# Patient Record
Sex: FEMALE | Race: WHITE
[De-identification: names, ages, dates, MRNs, and addresses within clinical notes are randomized per-mention and may not be internally consistent; named-entity substitution may affect disease eponyms.]

---

## 2018-08-31 NOTE — WOMENS IMAGING REPORT
EXAM DESCRIPTION:  BILAT SCREENING MAMMO W/CAD



COMPLETED DATE/TIME:  8/31/2018 3:03 pm



REASON FOR STUDY:  ROUTINE SCREENING MAMMOGRAM Z12.31 Z12.31  ENCNTR SCREEN MAMMOGRAM FOR MALIGNANT N
EOPLASM OF AILYN



COMPARISON:  Multiple since 2009



TECHNIQUE:  Standard craniocaudal and mediolateral oblique views of each breast recorded using [employer]
l acquisition.



LIMITATIONS:  None.



FINDINGS:  No masses, calcifications or architectural distortion. No areas of suspicion.

Read with the assistance of CAD.

.[facility] - R2 [employer] Version 1.3

.[facility] - R2 Cen[employer] Version 1.3

.[facility] - R2 Cenova Version 2.4

.[facility] - R2 Cenova Version 2.4

.[facility] - R2  Version 9.2



IMPRESSION:  NORMAL MAMMOGRAM.  BIRADS 1.



BREAST DENSITY:  b. There are scattered areas of fibroglandular density.



BIRAD:  1 NEGATIVE



RECOMMENDATION:  ROUTINE SCREENING

Please continue yearly bilateral screening mammography/tomosynthesis in September 2019



COMMENT:  The patient has been notified of the results by letter per SA requirements. Additional no
tification policies are in place for contacting patient with suspicious or incomplete findings.

Quality ID #225: The American College of Radiology recommends an annual screening mammogram for women
 aged 40 years or over. This facility utilizes a reminder system to ensure that all patients receive 
reminder letters, and/or direct phone calls for appointments. This includes reminders for routine scr
eening mammograms, diagnostic mammograms, or other Breast Imaging Interventions when appropriate.  Th
is patient will be placed in the appropriate reminder system.

The American College of Radiology (ACR) has developed recommendations for screening MRI of the breast
s in certain patient populations, to be used in conjunction with mammography.  Breast MRI surveillanc
e may be appropriate for women with more than 20% lifetime risk of developing breast cancer  as deter
mined by genetic testing, significant family history of the disease, or history of mantle radiation f
or Hodgkins Disease.  ACR Practice Guidelines 2008.



TECHNICAL DOCUMENTATION:  FINDING NUMBER: (1)

ASSESSMENT: (1)

JOB ID:  [id number]

 2011 [email or web address]- All Rights Reserved



Reading location - IP/workstation name: [email or web address]

## 2018-11-06 ENCOUNTER — HOSPITAL ENCOUNTER (OUTPATIENT)
Dept: HOSPITAL 62 - OD | Age: 73
End: 2018-11-06
Attending: FAMILY MEDICINE
Payer: MEDICARE

## 2018-11-06 DIAGNOSIS — Z79.899: ICD-10-CM

## 2018-11-06 DIAGNOSIS — J18.9: Primary | ICD-10-CM

## 2018-11-06 DIAGNOSIS — G25.0: ICD-10-CM

## 2018-11-06 LAB
ANION GAP SERPL CALC-SCNC: 12 MMOL/L (ref 5–19)
BUN SERPL-MCNC: 13 MG/DL (ref 7–20)
CALCIUM: 9.8 MG/DL (ref 8.4–10.2)
CHLORIDE SERPL-SCNC: 104 MMOL/L (ref 98–107)
CHOLEST SERPL-MCNC: 201.46 MG/DL (ref 0–200)
CO2 SERPL-SCNC: 28 MMOL/L (ref 22–30)
GLUCOSE SERPL-MCNC: 90 MG/DL (ref 75–110)
LDLC SERPL DIRECT ASSAY-MCNC: 111 MG/DL (ref ?–100)
POTASSIUM SERPL-SCNC: 4.4 MMOL/L (ref 3.6–5)
SODIUM SERPL-SCNC: 144 MMOL/L (ref 137–145)
TRIGL SERPL-MCNC: 72 MG/DL (ref ?–150)
VLDLC SERPL CALC-MCNC: 14 MG/DL (ref 10–31)

## 2018-11-06 PROCEDURE — 36415 COLL VENOUS BLD VENIPUNCTURE: CPT

## 2018-11-06 PROCEDURE — 84443 ASSAY THYROID STIM HORMONE: CPT

## 2018-11-06 PROCEDURE — 80061 LIPID PANEL: CPT

## 2018-11-06 PROCEDURE — 83036 HEMOGLOBIN GLYCOSYLATED A1C: CPT

## 2018-11-06 PROCEDURE — 80048 BASIC METABOLIC PNL TOTAL CA: CPT

## 2018-11-06 PROCEDURE — 71046 X-RAY EXAM CHEST 2 VIEWS: CPT

## 2018-11-06 NOTE — RADIOLOGY REPORT (SQ)
EXAM DESCRIPTION:  CHEST PA/LATERAL



COMPLETED DATE/TIME:  11/6/2018 2:42 pm



REASON FOR STUDY:  PNEUMONIA, UNSPECIFIED ORGANISM,COUGH



COMPARISON:  None.



EXAM PARAMETERS:  NUMBER OF VIEWS: two views

TECHNIQUE: Digital Frontal and Lateral radiographic views of the chest acquired.

RADIATION DOSE: NA

LIMITATIONS: none



FINDINGS:  LUNGS AND PLEURA: Cannot exclude a couple subcentimeter sized left lower lobe pulmonary no
dules.

MEDIASTINUM AND HILAR STRUCTURES: No masses or contour abnormalities.

HEART AND VASCULAR STRUCTURES: Heart normal size.  No evidence for failure.

BONES: No acute findings.

HARDWARE: None in the chest.

OTHER: No other significant finding.



IMPRESSION:  Possible pulmonary nodules.  No acute infiltrate is seen.



TECHNICAL DOCUMENTATION:  JOB ID:  [id number]

 2011 [employer]- All Rights Reserved



Reading location - IP/workstation name: [email or web address]

## 2018-11-13 ENCOUNTER — HOSPITAL ENCOUNTER (OUTPATIENT)
Dept: HOSPITAL 62 - RAD | Age: 73
End: 2018-11-13
Attending: FAMILY MEDICINE
Payer: MEDICARE

## 2018-11-13 DIAGNOSIS — R05: Primary | ICD-10-CM

## 2018-11-13 DIAGNOSIS — R91.1: ICD-10-CM

## 2018-11-13 PROCEDURE — 71260 CT THORAX DX C+: CPT

## 2018-11-13 NOTE — RADIOLOGY REPORT (SQ)
EXAM DESCRIPTION:  CT CHEST WITH



COMPLETED DATE/TIME:  11/13/2018 10:30 am



REASON FOR STUDY:  COUGH (R05), PULMONARY NODULE (R91.1) R05  COUGH R91.1  SOLITARY PULMONARY NODULE



COMPARISON:  Two-view chest 11/6/2018



TECHNIQUE:  CT scan of the chest performed using helical scanning technique with dynamic intravenous 
contrast injection.  Images reviewed with lung, soft tissue and bone windows.  Reconstructed coronal 
and sagittal MPR and MIP images reviewed.  All images stored on PACS.

All CT scanners at this facility use dose modulation, iterative reconstruction, and/or weight based d
osing when appropriate to reduce radiation dose to as low as reasonably achievable (ALARA).

[facility]: Dose Right  [facility]: CareDose    [facility]: Dose Right    [facility]: Teradose 4D    [facility]: Smart Technologies



CONTRAST TYPE AND DOSE:  contrast/concentration: Isovue 350.00 mg/ml; Total Contrast Delivered: 80.0 
ml; Total Saline Delivered: 55.0 ml



RENAL FUNCTION:  GFR > 60.



RADIATION DOSE:  CT Rad equipment meets quality standard of care and radiation dose reduction techniq
ues were employed. CTDIvol: 3.0 mGy. DLP: 113 mGy-cm. .



LIMITATIONS:  None.



FINDINGS:  LUNGS AND PLEURA: No worrisome pulmonary nodules are identified.  There are multiple small
 less than 5 mm pleural-based densities along the right minor fissure and around the periphery of bot
h lower lobes, of doubtful clinical significance.

No acute infiltrates.  No pleural effusion.  No pneumothorax.

HILAR AND MEDIASTINAL STRUCTURES: Benign calcified left hilar lymph nodes.

HEART AND VASCULAR STRUCTURES: No aneurysm or dissection.  No central pulmonary emboli.  No pericardi
al effusion.

HARDWARE: None in the chest.

UPPER ABDOMEN: Multiple calcified granulomas in the liver and spleen

THYROID AND OTHER SOFT TISSUES: 1.6 cm nodule lower pole right lobe thyroid

BONES: No significant finding.

OTHER: No other significant finding.



IMPRESSION:  No worrisome pulmonary nodules



TECHNICAL DOCUMENTATION:  JOB ID:  [id number]

Quality ID # 436: Final reports with documentation of one or more dose reduction techniques (e.g., Au
tomated exposure control, adjustment of the mA and/or kV according to patient size, use of iterative 
reconstruction technique)

 2011 [employer]- All Rights Reserved



Reading location - IP/workstation name: [email or web address]

## 2019-07-11 ENCOUNTER — HOSPITAL ENCOUNTER (OUTPATIENT)
Dept: HOSPITAL 62 - OD | Age: 74
End: 2019-07-11
Attending: FAMILY MEDICINE
Payer: MEDICARE

## 2019-07-11 DIAGNOSIS — E78.5: Primary | ICD-10-CM

## 2019-07-11 DIAGNOSIS — Z79.899: ICD-10-CM

## 2019-07-11 LAB
ANION GAP SERPL CALC-SCNC: 8 MMOL/L (ref 5–19)
BUN SERPL-MCNC: 16 MG/DL (ref 7–20)
CALCIUM: 10 MG/DL (ref 8.4–10.2)
CHLORIDE SERPL-SCNC: 103 MMOL/L (ref 98–107)
CHOLEST SERPL-MCNC: 231.64 MG/DL (ref 0–200)
CO2 SERPL-SCNC: 29 MMOL/L (ref 22–30)
GLUCOSE SERPL-MCNC: 90 MG/DL (ref 75–110)
LDLC SERPL DIRECT ASSAY-MCNC: 114 MG/DL (ref ?–100)
POTASSIUM SERPL-SCNC: 4.7 MMOL/L (ref 3.6–5)
SODIUM SERPL-SCNC: 139.8 MMOL/L (ref 137–145)
TRIGL SERPL-MCNC: 73 MG/DL (ref ?–150)
VLDLC SERPL CALC-MCNC: 15 MG/DL (ref 10–31)

## 2019-07-11 PROCEDURE — 80061 LIPID PANEL: CPT

## 2019-07-11 PROCEDURE — 83036 HEMOGLOBIN GLYCOSYLATED A1C: CPT

## 2019-07-11 PROCEDURE — 36415 COLL VENOUS BLD VENIPUNCTURE: CPT

## 2019-07-11 PROCEDURE — 84443 ASSAY THYROID STIM HORMONE: CPT

## 2019-07-11 PROCEDURE — 80048 BASIC METABOLIC PNL TOTAL CA: CPT

## 2020-01-10 ENCOUNTER — HOSPITAL ENCOUNTER (OUTPATIENT)
Dept: HOSPITAL 62 - WI | Age: 75
End: 2020-01-10
Attending: FAMILY MEDICINE
Payer: MEDICARE

## 2020-01-10 DIAGNOSIS — Z12.31: Primary | ICD-10-CM

## 2020-01-10 PROCEDURE — 77063 BREAST TOMOSYNTHESIS BI: CPT

## 2020-01-10 PROCEDURE — 77067 SCR MAMMO BI INCL CAD: CPT

## 2020-01-10 NOTE — WOMENS IMAGING REPORT
EXAM DESCRIPTION:  3D SCREENING MAMMO BILAT



COMPLETED DATE/TIME:  1/10/2020 10:38 am



REASON FOR STUDY:  Z12.31 ENCOUNTER FOR SCREENING MAMMOGRAM FOR MALIGNANT NEOPLASM OF BREAST Z12.31  
ENCNTR SCREEN MAMMOGRAM FOR MALIGNANT NEOPLASM OF AILYN



COMPARISON:  [id number]



EXAM PARAMETERS:  Views: Standard craniocaudal and mediolateral oblique views of each breast recorded
 using digital acquisition and breast tomosynthesis.

Read with the assistance of CAD.

[facility] [employer]  Version 9.2



LIMITATIONS:  None.



FINDINGS:  No suspicious masses, suspicious calcifications or architectural distortion. No areas of c
oncern.



IMPRESSION:   NEGATIVE MAMMOGRAM. BIRADS 1.



BREAST DENSITY:  b. There are scattered areas of fibroglandular density.



BIRAD:  ASSESSMENT:  1 NEGATIVE



RECOMMENDATION:  ROUTINE SCREENING



COMMENT:  The patient has been notified of the results by letter per MQSA requirements. Additional no
tification policies are in place for contacting patient with suspicious or incomplete findings.

Quality ID #225: The American College of Radiology recommends an annual screening mammogram for women
 aged 40 years or over. This facility utilizes a reminder system to ensure that all patients receive 
reminder letters, and/or direct phone calls for appointments. This includes reminders for routine scr
eening mammograms, diagnostic mammograms, or other Breast Imaging Interventions when appropriate.  Th
is patient will be placed in the appropriate reminder system.



TECHNICAL DOCUMENTATION:  FINDING NUMBER: (1)

ASSESSMENT:  (1)

JOB ID:  [id number]

 2011 [employer]- All Rights Reserved



Reading location - IP/workstation name: [email or web address]

## 2020-06-16 ENCOUNTER — HOSPITAL ENCOUNTER (OUTPATIENT)
Dept: HOSPITAL 62 - OD | Age: 75
End: 2020-06-16
Attending: FAMILY MEDICINE
Payer: MEDICARE

## 2020-06-16 DIAGNOSIS — E78.5: Primary | ICD-10-CM

## 2020-06-16 DIAGNOSIS — G25.0: ICD-10-CM

## 2020-06-16 DIAGNOSIS — Z79.899: ICD-10-CM

## 2020-06-16 LAB
ANION GAP SERPL CALC-SCNC: 2 MMOL/L (ref 5–19)
BUN SERPL-MCNC: 16 MG/DL (ref 7–20)
CALCIUM: 9.4 MG/DL (ref 8.4–10.2)
CHLORIDE SERPL-SCNC: 106 MMOL/L (ref 98–107)
CHOLEST SERPL-MCNC: 195.99 MG/DL (ref 0–200)
CO2 SERPL-SCNC: 31 MMOL/L (ref 22–30)
GLUCOSE SERPL-MCNC: 90 MG/DL (ref 75–110)
LDLC SERPL DIRECT ASSAY-MCNC: 98 MG/DL (ref ?–100)
POTASSIUM SERPL-SCNC: 4.4 MMOL/L (ref 3.6–5)
TRIGL SERPL-MCNC: 92 MG/DL (ref ?–150)
VLDLC SERPL CALC-MCNC: 18 MG/DL (ref 10–31)

## 2020-06-16 PROCEDURE — 80061 LIPID PANEL: CPT

## 2020-06-16 PROCEDURE — 36415 COLL VENOUS BLD VENIPUNCTURE: CPT

## 2020-06-16 PROCEDURE — 84443 ASSAY THYROID STIM HORMONE: CPT

## 2020-06-16 PROCEDURE — 80048 BASIC METABOLIC PNL TOTAL CA: CPT

## 2020-06-16 PROCEDURE — 83036 HEMOGLOBIN GLYCOSYLATED A1C: CPT

## 2021-01-12 ENCOUNTER — HOSPITAL ENCOUNTER (OUTPATIENT)
Dept: HOSPITAL 62 - WI | Age: 76
End: 2021-01-12
Attending: FAMILY MEDICINE
Payer: MEDICARE

## 2021-01-12 DIAGNOSIS — Z12.31: Primary | ICD-10-CM

## 2021-01-12 PROCEDURE — 77067 SCR MAMMO BI INCL CAD: CPT

## 2021-01-12 NOTE — WOMENS IMAGING REPORT
EXAM DESCRIPTION:  BILAT SCREENING MAMMO W/CAD



IMAGES COMPLETED DATE/TIME:  1/12/2021 11:19 am



REASON FOR STUDY:  ROUTINE SCREENING MAMMOGRAM Z12.31 Z12.31  ENCNTR SCREEN MAMMOGRAM FOR MALIGNANT N
EOPLASM OF AILYN



COMPARISON:  1/10/2020, 8/31/2018, 5/4/2017



EXAM PARAMETERS:  Standard craniocaudal and mediolateral oblique views of each breast recorded using 
digital acquisition.

Read with the assistance of CAD.

.[facility] [employer]  Version 9.2



LIMITATIONS:  None.



FINDINGS:  No suspicious masses, suspicious calcifications or architectural distortion. No areas of c
oncern.



IMPRESSION:  NEGATIVE MAMMOGRAM.  BIRADS 1



BREAST DENSITY:  b. There are scattered areas of fibroglandular density.



BIRAD:  ASSESSMENT:  1 NEGATIVE



RECOMMENDATION:  ROUTINE SCREENING



COMMENT:  The patient has been notified of the results by letter per MQSA requirements. Additional no
tification policies are in place for contacting patient with suspicious or incomplete findings.

Quality ID #225: The American College of Radiology recommends an annual screening mammogram for women
 aged 40 years or over. This facility utilizes a reminder system to ensure that all patients receive 
reminder letters, and/or direct phone calls for appointments. This includes reminders for routine scr
eening mammograms, diagnostic mammograms, or other Breast Imaging Interventions when appropriate.  Th
is patient will be placed in the appropriate reminder system.



TECHNICAL DOCUMENTATION:  FINDING NUMBER: (1)

ASSESSMENT: (1)

JOB ID:  [id number]

 2011 [employer]- All Rights Reserved



Reading location - IP/workstation name: [email or web address]